# Patient Record
Sex: FEMALE | Race: OTHER | HISPANIC OR LATINO | URBAN - METROPOLITAN AREA
[De-identification: names, ages, dates, MRNs, and addresses within clinical notes are randomized per-mention and may not be internally consistent; named-entity substitution may affect disease eponyms.]

---

## 2022-09-18 ENCOUNTER — EMERGENCY (EMERGENCY)
Facility: HOSPITAL | Age: 67
LOS: 1 days | Discharge: HOME | End: 2022-09-18
Attending: STUDENT IN AN ORGANIZED HEALTH CARE EDUCATION/TRAINING PROGRAM | Admitting: STUDENT IN AN ORGANIZED HEALTH CARE EDUCATION/TRAINING PROGRAM

## 2022-09-18 VITALS
OXYGEN SATURATION: 99 % | TEMPERATURE: 98 F | HEART RATE: 77 BPM | DIASTOLIC BLOOD PRESSURE: 66 MMHG | SYSTOLIC BLOOD PRESSURE: 123 MMHG | RESPIRATION RATE: 18 BRPM

## 2022-09-18 LAB
A1C WITH ESTIMATED AVERAGE GLUCOSE RESULT: 9.2 % — HIGH (ref 4–5.6)
ALBUMIN SERPL ELPH-MCNC: 4.4 G/DL — SIGNIFICANT CHANGE UP (ref 3.3–5)
ALP SERPL-CCNC: 197 U/L — HIGH (ref 40–120)
ALT FLD-CCNC: 32 U/L — SIGNIFICANT CHANGE UP (ref 4–33)
ANION GAP SERPL CALC-SCNC: 13 MMOL/L — SIGNIFICANT CHANGE UP (ref 7–14)
AST SERPL-CCNC: 22 U/L — SIGNIFICANT CHANGE UP (ref 4–32)
B PERT DNA SPEC QL NAA+PROBE: SIGNIFICANT CHANGE UP
B PERT+PARAPERT DNA PNL SPEC NAA+PROBE: SIGNIFICANT CHANGE UP
B-OH-BUTYR SERPL-SCNC: 0.3 MMOL/L — SIGNIFICANT CHANGE UP (ref 0–0.4)
BASOPHILS # BLD AUTO: 0.05 K/UL — SIGNIFICANT CHANGE UP (ref 0–0.2)
BASOPHILS NFR BLD AUTO: 1 % — SIGNIFICANT CHANGE UP (ref 0–2)
BILIRUB SERPL-MCNC: 0.2 MG/DL — SIGNIFICANT CHANGE UP (ref 0.2–1.2)
BLOOD GAS VENOUS COMPREHENSIVE RESULT: SIGNIFICANT CHANGE UP
BORDETELLA PARAPERTUSSIS (RAPRVP): SIGNIFICANT CHANGE UP
BUN SERPL-MCNC: 26 MG/DL — HIGH (ref 7–23)
C PNEUM DNA SPEC QL NAA+PROBE: SIGNIFICANT CHANGE UP
CALCIUM SERPL-MCNC: 9.7 MG/DL — SIGNIFICANT CHANGE UP (ref 8.4–10.5)
CHLORIDE SERPL-SCNC: 103 MMOL/L — SIGNIFICANT CHANGE UP (ref 98–107)
CO2 SERPL-SCNC: 20 MMOL/L — LOW (ref 22–31)
CREAT SERPL-MCNC: 0.68 MG/DL — SIGNIFICANT CHANGE UP (ref 0.5–1.3)
EGFR: 97 ML/MIN/1.73M2 — SIGNIFICANT CHANGE UP
EOSINOPHIL # BLD AUTO: 0.2 K/UL — SIGNIFICANT CHANGE UP (ref 0–0.5)
EOSINOPHIL NFR BLD AUTO: 4 % — SIGNIFICANT CHANGE UP (ref 0–6)
ESTIMATED AVERAGE GLUCOSE: 217 — SIGNIFICANT CHANGE UP
FLUAV SUBTYP SPEC NAA+PROBE: SIGNIFICANT CHANGE UP
FLUBV RNA SPEC QL NAA+PROBE: SIGNIFICANT CHANGE UP
GLUCOSE SERPL-MCNC: 269 MG/DL — HIGH (ref 70–99)
HADV DNA SPEC QL NAA+PROBE: SIGNIFICANT CHANGE UP
HCOV 229E RNA SPEC QL NAA+PROBE: SIGNIFICANT CHANGE UP
HCOV HKU1 RNA SPEC QL NAA+PROBE: SIGNIFICANT CHANGE UP
HCOV NL63 RNA SPEC QL NAA+PROBE: SIGNIFICANT CHANGE UP
HCOV OC43 RNA SPEC QL NAA+PROBE: SIGNIFICANT CHANGE UP
HCT VFR BLD CALC: 47.5 % — HIGH (ref 34.5–45)
HGB BLD-MCNC: 15.6 G/DL — HIGH (ref 11.5–15.5)
HMPV RNA SPEC QL NAA+PROBE: SIGNIFICANT CHANGE UP
HPIV1 RNA SPEC QL NAA+PROBE: SIGNIFICANT CHANGE UP
HPIV2 RNA SPEC QL NAA+PROBE: SIGNIFICANT CHANGE UP
HPIV3 RNA SPEC QL NAA+PROBE: SIGNIFICANT CHANGE UP
HPIV4 RNA SPEC QL NAA+PROBE: SIGNIFICANT CHANGE UP
IANC: 2.91 K/UL — SIGNIFICANT CHANGE UP (ref 1.8–7.4)
IMM GRANULOCYTES NFR BLD AUTO: 0 % — SIGNIFICANT CHANGE UP (ref 0–0.9)
LYMPHOCYTES # BLD AUTO: 1.43 K/UL — SIGNIFICANT CHANGE UP (ref 1–3.3)
LYMPHOCYTES # BLD AUTO: 28.6 % — SIGNIFICANT CHANGE UP (ref 13–44)
M PNEUMO DNA SPEC QL NAA+PROBE: SIGNIFICANT CHANGE UP
MCHC RBC-ENTMCNC: 32 PG — SIGNIFICANT CHANGE UP (ref 27–34)
MCHC RBC-ENTMCNC: 32.8 GM/DL — SIGNIFICANT CHANGE UP (ref 32–36)
MCV RBC AUTO: 97.5 FL — SIGNIFICANT CHANGE UP (ref 80–100)
MONOCYTES # BLD AUTO: 0.41 K/UL — SIGNIFICANT CHANGE UP (ref 0–0.9)
MONOCYTES NFR BLD AUTO: 8.2 % — SIGNIFICANT CHANGE UP (ref 2–14)
NEUTROPHILS # BLD AUTO: 2.91 K/UL — SIGNIFICANT CHANGE UP (ref 1.8–7.4)
NEUTROPHILS NFR BLD AUTO: 58.2 % — SIGNIFICANT CHANGE UP (ref 43–77)
NRBC # BLD: 0 /100 WBCS — SIGNIFICANT CHANGE UP (ref 0–0)
NRBC # FLD: 0 K/UL — SIGNIFICANT CHANGE UP (ref 0–0)
PLATELET # BLD AUTO: 263 K/UL — SIGNIFICANT CHANGE UP (ref 150–400)
POTASSIUM SERPL-MCNC: 4.2 MMOL/L — SIGNIFICANT CHANGE UP (ref 3.5–5.3)
POTASSIUM SERPL-SCNC: 4.2 MMOL/L — SIGNIFICANT CHANGE UP (ref 3.5–5.3)
PROT SERPL-MCNC: 7.4 G/DL — SIGNIFICANT CHANGE UP (ref 6–8.3)
RAPID RVP RESULT: SIGNIFICANT CHANGE UP
RBC # BLD: 4.87 M/UL — SIGNIFICANT CHANGE UP (ref 3.8–5.2)
RBC # FLD: 13.4 % — SIGNIFICANT CHANGE UP (ref 10.3–14.5)
RSV RNA SPEC QL NAA+PROBE: SIGNIFICANT CHANGE UP
RV+EV RNA SPEC QL NAA+PROBE: SIGNIFICANT CHANGE UP
SARS-COV-2 RNA SPEC QL NAA+PROBE: SIGNIFICANT CHANGE UP
SODIUM SERPL-SCNC: 136 MMOL/L — SIGNIFICANT CHANGE UP (ref 135–145)
TSH SERPL-MCNC: 2.44 UIU/ML — SIGNIFICANT CHANGE UP (ref 0.27–4.2)
WBC # BLD: 5 K/UL — SIGNIFICANT CHANGE UP (ref 3.8–10.5)
WBC # FLD AUTO: 5 K/UL — SIGNIFICANT CHANGE UP (ref 3.8–10.5)

## 2022-09-18 PROCEDURE — 99285 EMERGENCY DEPT VISIT HI MDM: CPT | Mod: 25

## 2022-09-18 PROCEDURE — 93010 ELECTROCARDIOGRAM REPORT: CPT

## 2022-09-18 RX ORDER — SODIUM CHLORIDE 9 MG/ML
1000 INJECTION INTRAMUSCULAR; INTRAVENOUS; SUBCUTANEOUS ONCE
Refills: 0 | Status: COMPLETED | OUTPATIENT
Start: 2022-09-18 | End: 2022-09-18

## 2022-09-18 RX ORDER — FAMOTIDINE 10 MG/ML
20 INJECTION INTRAVENOUS ONCE
Refills: 0 | Status: COMPLETED | OUTPATIENT
Start: 2022-09-18 | End: 2022-09-18

## 2022-09-18 RX ADMIN — FAMOTIDINE 20 MILLIGRAM(S): 10 INJECTION INTRAVENOUS at 18:54

## 2022-09-18 RX ADMIN — SODIUM CHLORIDE 1000 MILLILITER(S): 9 INJECTION INTRAMUSCULAR; INTRAVENOUS; SUBCUTANEOUS at 18:54

## 2022-09-18 RX ADMIN — Medication 30 MILLILITER(S): at 19:44

## 2022-09-18 NOTE — ED PROVIDER NOTE - PATIENT PORTAL LINK FT
You can access the FollowMyHealth Patient Portal offered by [facility] by registering at the following website: [email or web address]. By joining [employer]’s FollowMyHealth portal, you will also be able to view your health information using other applications (apps) compatible with our system.

## 2022-09-18 NOTE — ED ADULT NURSE NOTE - OBJECTIVE STATEMENT
Pt arriving to intake 9 A&OX4 ambulatory c/o abdominal discomfort. Pt sent by Urgent Care for high blood sugar. Endorsing rash to L arm. Respirations even and unlabored. No acute distress. Son at bedside for translation. 20G IV placed in RAC. Labs drawn and sent. Medicated as per LAVERNE. MD at bedside for eval.

## 2022-09-18 NOTE — ED PROVIDER NOTE - NSPTACCESSSVCSAPPTDETAILS_ED_ALL_ED_FT
diabetes mellitus type 2 with hgb a1c of 9.2 diabetes mellitus type 2 with hgb a1c of 9.2    Please call patient's son Juice who provides translation ([phone])

## 2022-09-18 NOTE — ED PROVIDER NOTE - ATTENDING APP SHARED VISIT CONTRIBUTION OF CARE
67 y/o F with PMH HTN, HLD, DM on oral treatment w/ metformin , jardiacnce p/w increase fatigue and malaise along with abdominal bloating. pt reports her blood sugar is usually in the 100's was elevated in the 300's today. She states she feels fatigued and has developed a rash on her b/l LE w/ movement to her abdomen. Pt states her granddaugher is sick with a uri and rash as well.   denies fever, chills, chest pain, SOB, abdominal pain, diarrhea, dysuria, syncope, bleeding, +new rash,weakness, numbness, blurred vision    ROS  otherwise negative as per HPI  Gen: Awake, Alert, WD, WN, NAD  Head:  NC/AT  Eyes:  PERRL, EOMI, Conjunctiva pink, lids normal, no scleral icterus  ENT: OP clear, moist mucus membranes  Neck: supple, nontender, no meningismus, no JVD, trachea midline  Cardiac/CV:  S1 S2, RRR, no M/G/R  Respiratory/Pulm:  CTAB, good air movement, normal resp effort, no wheezes/stridor/retractions/rales/rhonchi  Gastrointestinal/Abdomen:  Soft, nontender, nondistended, +BS, no rebound/guarding  Back:  no CVAT, no MLT  Ext:  warm, well perfused, moving all extremities spontaneously, no peripheral edema, distal pulses intact  Skin: maccular rash on chest and extremities   Neuro:  AAOx3, sensation intact, motor 5/5 x 4 extremities, normal gait, speech clear  mdm as above

## 2022-09-18 NOTE — ED PROVIDER NOTE - CROS ED SKIN NEG
Sahil called in and stated he needs the death certificate signed ,    HE STATED THEY CANNOT MOVE FORWARD WITH  PLANS UNTIL IT IS SIGNED . SAHIL HAS REQUESTED TO SPEAK TO THE PRACTICE MANAGER . HUB ATTEMPTED CONTACT PRACTICE NOT SUCCESSFUL .         SAHIL CALL BACK  [phone]    no itch

## 2022-09-18 NOTE — ED ADULT NURSE NOTE - CHIEF COMPLAINT QUOTE
Pt AOX4 c/o indigestion and elevated glucose, Hx of DM type 2 fs glu 253 in triage; was seen in Urgi care and glu was 303; pt has felt a little bloated since returning from [facility] 3 days ago

## 2022-09-18 NOTE — ED PROVIDER NOTE - NS ED ATTENDING STATEMENT MOD
This was a shared visit with the JIAN. I reviewed and verified the documentation and independently performed the documented: Crescentic Advancement Flap Text: The defect edges were debeveled with a #15 scalpel blade.  Given the location of the defect and the proximity to free margins a crescentic advancement flap was deemed most appropriate.  Using a sterile surgical marker, the appropriate advancement flap was drawn incorporating the defect and placing the expected incisions within the relaxed skin tension lines where possible.    The area thus outlined was incised deep to adipose tissue with a #15 scalpel blade.  The skin margins were undermined to an appropriate distance in all directions utilizing iris scissors.

## 2022-09-18 NOTE — ED PROVIDER NOTE - NSCAREINITIATED _GEN_ER
.      CERTIFICATE OF RETURN TO SCHOOL    April 12, 2022      Re:   Cristhian Edward  [address] [phone]                        This is to certify that [employer] [facility] Edward has been under my care from 4/12/2022 and can return on 4/18/22.           SIGNATURE:___________________________________________,   4/12/2022      Marilu Mallory PA-C           [facility] [place]  [facility]  [address] [id number] [phone]  Ext. [phone]   Immanuel Alonso)

## 2022-09-18 NOTE — ED PROVIDER NOTE - NSFOLLOWUPINSTRUCTIONS_ED_ALL_ED_FT
Advance activity as tolerated.  Continue all previously prescribed medications as directed unless otherwise instructed.  Check your fingerstick glucose before every meal and every day at bedtime.  Follow up with your primary care physician and endocrinology (call [phone] to make an appointment) in 48-72 hours- bring copies of your results.  Return to the ER for worsening or persistent symptoms, including but not limited to worsening/persistent lightheadedness, passing out, blood sugars less than 70 (you were given verbal and printed instructions on how to manage low blood sugar), blood sugars over 300, rash involving your mouth/palms/soles, chest pain, shortness of breath, and/or ANY NEW OR CONCERNING SYMPTOMS. If you have issues obtaining follow up, please call: [phone] to obtain a doctor or specialist who takes your insurance in your area.  You may call [phone] to make an appointment with the internal medicine clinic.

## 2022-09-18 NOTE — ED PROVIDER NOTE - OBJECTIVE STATEMENT
Pt is a 67 y/o F PMHx DM type 2, HTN, HLD, cholecystectomy p/w tiredness, abdominal bloating and elevated blood glucose today.  Pt speaks Sudanese; electing to have son, graciela Bose.  Pt reports recently returning from trip to [facility].  For past 2 days, pt developed painless nonpruritic scattered rash, which started at bilateral thighs, then began to spread to abdomen and bilateral arms.  Pt reports developing tiredness, malaise and abdominal bloating this morning.  Abdominal bloating has since resolved.  Pt also found to have blood glucose ~260.  She was evaluated at urgent care where she was directed to ED for evaluation. + sick contact with granddaughter who has a fever and URI symptoms. Pt is a 65 y/o F PMHx DM type 2, HTN, HLD, cholecystectomy p/w tiredness, abdominal bloating and elevated blood glucose today.  Pt speaks [place]; electing to have son, graciela Bose.  Pt reports recently returning from trip to [facility].  For past 2 days, pt developed painless nonpruritic scattered rash, which started at bilateral thighs, then began to spread to abdomen and bilateral arms.  Pt reports developing tiredness, malaise and abdominal bloating this morning.  Abdominal bloating has since resolved.  Pt also found to have blood glucose ~260.  She was evaluated at urgent care where she was directed to ED for evaluation. + sick contact with granddaughter who has a fever and URI symptoms.  Denies any fevers, chills, nausea, vomiting, chest pain, SOB, jaw/arm/neck/back pain, dysuria, cloudy urine, diarrhea, constipation, numbness, weakness, headache, neck pain/stiffness, nasal congestion, throat pain, illicit drug use, ETOH abuse, new medications/foods/cosmetic products.

## 2022-09-18 NOTE — ED ADULT TRIAGE NOTE - CHIEF COMPLAINT QUOTE
Pt AOX4 c/o indigestion and elevated glucose, Hx of DM type 2 fs glu 253 in triage; was seen in Urgi care and glu was 303; pt has felt a little bloated since returning from [facility] 3 days ago

## 2022-09-18 NOTE — ED PROVIDER NOTE - PROGRESS NOTE DETAILS
AMANDA SANCHEZ:  Pt offers no active complaints at this time. Hgb A1c noted to be 9.2.  Pt electing to follow up with her endocrinologist.  Pt medically stable for discharge.  Strict return precautions given.  pt to follow up with PMD.  Reassessment performed and plan for discharge discussed with Dr. Hamilton who agrees with disposition and discharge plan.

## 2022-09-18 NOTE — ED PROVIDER NOTE - CLINICAL SUMMARY MEDICAL DECISION MAKING FREE TEXT BOX
Pt is a 65 y/o F PMHx DM type 2, HTN, HLD, cholecystectomy p/w tiredness, abdominal bloating and elevated blood glucose today. -- likely viral syndrome -- labs, tsh, bhb, vbg, iv fluids, pepcid, maalox

## 2022-11-16 ENCOUNTER — EMERGENCY (EMERGENCY)
Facility: HOSPITAL | Age: 67
LOS: 1 days | Discharge: HOME | End: 2022-11-16
Attending: EMERGENCY MEDICINE | Admitting: EMERGENCY MEDICINE

## 2022-11-16 VITALS
SYSTOLIC BLOOD PRESSURE: 106 MMHG | OXYGEN SATURATION: 100 % | RESPIRATION RATE: 20 BRPM | HEART RATE: 72 BPM | TEMPERATURE: 98 F | DIASTOLIC BLOOD PRESSURE: 48 MMHG

## 2022-11-16 VITALS
OXYGEN SATURATION: 100 % | HEART RATE: 63 BPM | TEMPERATURE: 98 F | SYSTOLIC BLOOD PRESSURE: 111 MMHG | DIASTOLIC BLOOD PRESSURE: 60 MMHG | RESPIRATION RATE: 17 BRPM

## 2022-11-16 LAB
ALBUMIN SERPL ELPH-MCNC: 4.3 G/DL — SIGNIFICANT CHANGE UP (ref 3.3–5)
ALP SERPL-CCNC: 101 U/L — SIGNIFICANT CHANGE UP (ref 40–120)
ALT FLD-CCNC: 20 U/L — SIGNIFICANT CHANGE UP (ref 4–33)
ANION GAP SERPL CALC-SCNC: 12 MMOL/L — SIGNIFICANT CHANGE UP (ref 7–14)
AST SERPL-CCNC: 22 U/L — SIGNIFICANT CHANGE UP (ref 4–32)
BASOPHILS # BLD AUTO: 0.03 K/UL — SIGNIFICANT CHANGE UP (ref 0–0.2)
BASOPHILS NFR BLD AUTO: 0.6 % — SIGNIFICANT CHANGE UP (ref 0–2)
BILIRUB SERPL-MCNC: 0.4 MG/DL — SIGNIFICANT CHANGE UP (ref 0.2–1.2)
BUN SERPL-MCNC: 19 MG/DL — SIGNIFICANT CHANGE UP (ref 7–23)
CALCIUM SERPL-MCNC: 9.5 MG/DL — SIGNIFICANT CHANGE UP (ref 8.4–10.5)
CHLORIDE SERPL-SCNC: 106 MMOL/L — SIGNIFICANT CHANGE UP (ref 98–107)
CO2 SERPL-SCNC: 22 MMOL/L — SIGNIFICANT CHANGE UP (ref 22–31)
CREAT SERPL-MCNC: 0.66 MG/DL — SIGNIFICANT CHANGE UP (ref 0.5–1.3)
EGFR: 97 ML/MIN/1.73M2 — SIGNIFICANT CHANGE UP
EOSINOPHIL # BLD AUTO: 0.09 K/UL — SIGNIFICANT CHANGE UP (ref 0–0.5)
EOSINOPHIL NFR BLD AUTO: 1.9 % — SIGNIFICANT CHANGE UP (ref 0–6)
GLUCOSE SERPL-MCNC: 120 MG/DL — HIGH (ref 70–99)
HCT VFR BLD CALC: 40.8 % — SIGNIFICANT CHANGE UP (ref 34.5–45)
HGB BLD-MCNC: 13.4 G/DL — SIGNIFICANT CHANGE UP (ref 11.5–15.5)
IANC: 2.84 K/UL — SIGNIFICANT CHANGE UP (ref 1.8–7.4)
IMM GRANULOCYTES NFR BLD AUTO: 0.2 % — SIGNIFICANT CHANGE UP (ref 0–0.9)
LYMPHOCYTES # BLD AUTO: 1.37 K/UL — SIGNIFICANT CHANGE UP (ref 1–3.3)
LYMPHOCYTES # BLD AUTO: 29.1 % — SIGNIFICANT CHANGE UP (ref 13–44)
MCHC RBC-ENTMCNC: 31.5 PG — SIGNIFICANT CHANGE UP (ref 27–34)
MCHC RBC-ENTMCNC: 32.8 GM/DL — SIGNIFICANT CHANGE UP (ref 32–36)
MCV RBC AUTO: 96 FL — SIGNIFICANT CHANGE UP (ref 80–100)
MONOCYTES # BLD AUTO: 0.37 K/UL — SIGNIFICANT CHANGE UP (ref 0–0.9)
MONOCYTES NFR BLD AUTO: 7.9 % — SIGNIFICANT CHANGE UP (ref 2–14)
NEUTROPHILS # BLD AUTO: 2.84 K/UL — SIGNIFICANT CHANGE UP (ref 1.8–7.4)
NEUTROPHILS NFR BLD AUTO: 60.3 % — SIGNIFICANT CHANGE UP (ref 43–77)
NRBC # BLD: 0 /100 WBCS — SIGNIFICANT CHANGE UP (ref 0–0)
NRBC # FLD: 0 K/UL — SIGNIFICANT CHANGE UP (ref 0–0)
PLATELET # BLD AUTO: 225 K/UL — SIGNIFICANT CHANGE UP (ref 150–400)
POTASSIUM SERPL-MCNC: 4.1 MMOL/L — SIGNIFICANT CHANGE UP (ref 3.5–5.3)
POTASSIUM SERPL-SCNC: 4.1 MMOL/L — SIGNIFICANT CHANGE UP (ref 3.5–5.3)
PROT SERPL-MCNC: 7.2 G/DL — SIGNIFICANT CHANGE UP (ref 6–8.3)
RBC # BLD: 4.25 M/UL — SIGNIFICANT CHANGE UP (ref 3.8–5.2)
RBC # FLD: 13 % — SIGNIFICANT CHANGE UP (ref 10.3–14.5)
SODIUM SERPL-SCNC: 140 MMOL/L — SIGNIFICANT CHANGE UP (ref 135–145)
WBC # BLD: 4.71 K/UL — SIGNIFICANT CHANGE UP (ref 3.8–10.5)
WBC # FLD AUTO: 4.71 K/UL — SIGNIFICANT CHANGE UP (ref 3.8–10.5)

## 2022-11-16 PROCEDURE — 99284 EMERGENCY DEPT VISIT MOD MDM: CPT

## 2022-11-16 PROCEDURE — 73564 X-RAY EXAM KNEE 4 OR MORE: CPT | Mod: 26,LT

## 2022-11-16 PROCEDURE — 73630 X-RAY EXAM OF FOOT: CPT | Mod: 26,50

## 2022-11-16 PROCEDURE — 73502 X-RAY EXAM HIP UNI 2-3 VIEWS: CPT | Mod: 26,LT

## 2022-11-16 RX ORDER — KETOROLAC TROMETHAMINE 30 MG/ML
15 SYRINGE (ML) INJECTION ONCE
Refills: 0 | Status: DISCONTINUED | OUTPATIENT
Start: 2022-11-16 | End: 2022-11-16

## 2022-11-16 RX ADMIN — Medication 15 MILLIGRAM(S): at 14:56

## 2022-11-16 NOTE — ED ADULT NURSE NOTE - OBJECTIVE STATEMENT
Pt received to surg, awake and alert, A&OX4, ambulatory. Primarily Bulgarian speaking, family assisting with translation. C/o BL leg pain x1 week, worsening over the last few days. States pain is worse with movement. Denies radiation of pain.  Respirations even and unlabored. Resting comfortably. Denies injury or trauma, CP, SOB, N/V, HA, dizziness, palpitations, fatigue. 20G IV placed to  LAC. NAD.

## 2022-11-16 NOTE — ED PROVIDER NOTE - CLINICAL SUMMARY MEDICAL DECISION MAKING FREE TEXT BOX
67-year-old female history of arthritis here with acute on chronic joint pain.  Obtain x-rays give pain medication likely discharge with Ortho referral.

## 2022-11-16 NOTE — ED PROVIDER NOTE - PROGRESS NOTE DETAILS
Lai, Med Tox Fellow: pts hip xrays done just not uploaded, spoke to xray and they're trying to upload images, no fx seen on images, pt can f/u outpt

## 2022-11-16 NOTE — ED PROVIDER NOTE - OBJECTIVE STATEMENT
67-year-old female history of diabetes hypertension osteoarthritis here with multiple medical complaints.  Declines , prefers daughter translate.  She complains of pain and swelling in the right foot the left foot the left knee and the right hip.  Denies any recent trauma.  Per patient's daughter, she has had the symptoms for the last few months but they have been worsening over the last few days.  She denies any history of DVT or PE, no leg redness, no chest pain or trouble breathing.  Her pcp rx gabapentin but no relief.

## 2022-11-16 NOTE — ED PROVIDER NOTE - NSFOLLOWUPCLINICS_GEN_ALL_ED_FT
[facility]  Orthopedic Surgery  [address] [address]  Phone: [phone]  Fax:   Follow Up Time: 4-6 Days

## 2022-11-16 NOTE — ED PROVIDER NOTE - NSFOLLOWUPINSTRUCTIONS_ED_ALL_ED_FT
- Please follow up with your Primary Care Doctor within 1 week. Bring your results from today.    - Tylenol up to 650 mg every 6 hours as needed for pain and/or Ibuprofen up to 400 mg every 6 hours as needed for pain.    - Be sure to return to the ED if you develop new, worsening, or any distressing symptoms.

## 2022-11-16 NOTE — ED PROVIDER NOTE - PATIENT PORTAL LINK FT
You can access the FollowMyHealth Patient Portal offered by [facility] by registering at the following website: [email or web address]. By joining [employer]’s FollowMyHealth portal, you will also be able to view your health information using other applications (apps) compatible with our system.

## 2022-11-16 NOTE — ED PROVIDER NOTE - MUSCULOSKELETAL MINIMAL EXAM
no swelling, no redness, full rom all joints, mild ttp over b/l feet, l knee, l hip.  fully wt bearing and ambulatory

## 2022-11-16 NOTE — ED PROVIDER NOTE - CARE PLAN
Principal Discharge DX:	Pain in joints of both feet  Secondary Diagnosis:	Knee pain  Secondary Diagnosis:	Hip pain   1

## 2023-08-07 ENCOUNTER — APPOINTMENT (OUTPATIENT)
Dept: GASTROENTEROLOGY | Facility: CLINIC | Age: 68
End: 2023-08-07
Payer: MEDICARE

## 2023-08-07 VITALS
DIASTOLIC BLOOD PRESSURE: 60 MMHG | WEIGHT: 147.5 LBS | OXYGEN SATURATION: 98 % | HEIGHT: 58.66 IN | SYSTOLIC BLOOD PRESSURE: 100 MMHG | HEART RATE: 80 BPM | RESPIRATION RATE: 14 BRPM | TEMPERATURE: 98 F | BODY MASS INDEX: 30.13 KG/M2

## 2023-08-07 DIAGNOSIS — E78.2 MIXED HYPERLIPIDEMIA: ICD-10-CM

## 2023-08-07 DIAGNOSIS — K62.89 OTHER SPECIFIED DISEASES OF ANUS AND RECTUM: ICD-10-CM

## 2023-08-07 DIAGNOSIS — E11.9 TYPE 2 DIABETES MELLITUS W/OUT COMPLICATIONS: ICD-10-CM

## 2023-08-07 PROBLEM — Z00.00 ENCOUNTER FOR PREVENTIVE HEALTH EXAMINATION: Status: ACTIVE | Noted: 2023-08-07

## 2023-08-07 PROCEDURE — 99204 OFFICE O/P NEW MOD 45 MIN: CPT | Mod: 25

## 2023-08-07 PROCEDURE — 83014 H PYLORI DRUG ADMIN: CPT

## 2023-08-07 RX ORDER — FAMOTIDINE 40 MG/1
40 TABLET, FILM COATED ORAL
Refills: 0 | Status: ACTIVE | COMMUNITY

## 2023-08-07 RX ORDER — ALENDRONATE SODIUM 70 MG/1
70 TABLET ORAL
Refills: 0 | Status: ACTIVE | COMMUNITY

## 2023-08-07 RX ORDER — ORAL SEMAGLUTIDE 14 MG/1
14 TABLET ORAL
Refills: 0 | Status: ACTIVE | COMMUNITY

## 2023-08-07 RX ORDER — ASPIRIN 81 MG
81 TABLET, DELAYED RELEASE (ENTERIC COATED) ORAL
Refills: 0 | Status: ACTIVE | COMMUNITY

## 2023-08-07 RX ORDER — PIOGLITAZONE AND GLIMEPIRIDE 30; 4 MG/1; MG/1
TABLET ORAL
Refills: 0 | Status: ACTIVE | COMMUNITY

## 2023-08-07 RX ORDER — HYDROCORTISONE 25 MG/G
2.5 CREAM TOPICAL
Qty: 1 | Refills: 2 | Status: ACTIVE | COMMUNITY
Start: 2023-08-07 | End: 1900-01-01

## 2023-08-07 RX ORDER — ROSUVASTATIN CALCIUM 5 MG/1
TABLET, FILM COATED ORAL
Refills: 0 | Status: ACTIVE | COMMUNITY

## 2023-08-07 RX ORDER — LISINOPRIL 30 MG/1
TABLET ORAL
Refills: 0 | Status: ACTIVE | COMMUNITY

## 2023-08-07 RX ORDER — GLIMEPIRIDE 4 MG/1
TABLET ORAL
Refills: 0 | Status: ACTIVE | COMMUNITY

## 2023-08-07 NOTE — HISTORY OF PRESENT ILLNESS
[FreeTextEntry1] : 66 yo female, from [facility], referred for evaluation of bloating, loose bms, epigastric pain. Previous evaluation from ? Valentin Lambert MD records to be requested. Hx of DM, hld, HTN and DM. No weight loss or anorexia.complains of rectal discomfort/pain, not responded to bacitracin rx by GYN md.

## 2023-08-07 NOTE — REVIEW OF SYSTEMS
[As Noted in HPI] : as noted in HPI [Abdominal Pain] : abdominal pain [Constipation] : constipation [Diarrhea] : diarrhea [Negative] : Heme/Lymph [Recent Weight Gain (___ Lbs)] : no recent weight gain [Recent Weight Loss (___ Lbs)] : no recent weight loss

## 2023-08-07 NOTE — ASSESSMENT
[FreeTextEntry1] : 66 yo female, from [facility], referred for evaluation of bloating, loose bms, epigastric pain. Previous evaluation from ? Valentin Lambert MD records to be requested. Hx of DM, hld, HTN and DM. No weight loss or anorexia.  IMP: 1. bloating, epigastric pain - exclude Hpylori 2. Altered bms 3. Type 2 DM 4. HLD 5. HTN  PLAN: 1. UBT 2. await and request previous GI records 3. RTO 6 weeks

## 2023-08-07 NOTE — PHYSICAL EXAM

## 2023-08-09 LAB — UREA BREATH TEST QL: NEGATIVE

## 2023-12-15 ENCOUNTER — APPOINTMENT (OUTPATIENT)
Dept: GASTROENTEROLOGY | Facility: CLINIC | Age: 68
End: 2023-12-15

## 2024-01-26 ENCOUNTER — APPOINTMENT (OUTPATIENT)
Dept: GASTROENTEROLOGY | Facility: CLINIC | Age: 69
End: 2024-01-26
Payer: MEDICARE

## 2024-01-26 VITALS
WEIGHT: 139 LBS | HEIGHT: 58.66 IN | HEART RATE: 79 BPM | DIASTOLIC BLOOD PRESSURE: 74 MMHG | OXYGEN SATURATION: 98 % | BODY MASS INDEX: 28.4 KG/M2 | TEMPERATURE: 97.7 F | RESPIRATION RATE: 14 BRPM | SYSTOLIC BLOOD PRESSURE: 120 MMHG

## 2024-01-26 DIAGNOSIS — R19.8 OTHER SPECIFIED SYMPTOMS AND SIGNS INVOLVING THE DIGESTIVE SYSTEM AND ABDOMEN: ICD-10-CM

## 2024-01-26 DIAGNOSIS — K22.70 BARRETT'S ESOPHAGUS W/OUT DYSPLASIA: ICD-10-CM

## 2024-01-26 PROCEDURE — 99214 OFFICE O/P EST MOD 30 MIN: CPT

## 2024-01-26 NOTE — HISTORY OF PRESENT ILLNESS
[de-identified] : 04/2022 Small hh, Perea's esophagus (Augello) [FreeTextEntry1] : 04/2022 dim polyp, grade 2 int. hem ( Petra)

## 2024-01-26 NOTE — ASSESSMENT
[FreeTextEntry1] : 69 yo female, from [facility], referred for evaluation of bloating, loose bms, epigastric pain. Previous evaluation from Yohana Lambert MD records to be requested. Hx of DM, hld, HTN and DM. No weight loss or anorexia.complains of rectal discomfort/pain, not responded to bacitracin rx by GYN md.  RTO today 1/25/24- reveiwed egd and supplement from Dr. Lambert. Hx of BE for followup; has altered bms, sometimes constipated. Seen with Upper sorbian speaking . IMP: 1. bloating, epigastric pain /BE 2. Altered bms 3. Type 2 DM 4. HLD 5. HTN  PLAN: 1. She  was advised to undergo endoscopy to which she agreed. The procedure will be performed in [facility] with the assistance of an anesthesiologist. The procedure was explained in detail and she understood the risks of the procedure not limited  to infection, bleeding, perforation, risk of anesthesia and risk of IV site problems,emergency surgery, missed lesions  or non-diagnosis of stomach or esophageal  cancer.He/She]  was advised that she could not drive home alone, if the patient chooses to receive sedation. Further diagnostic and treatment recommendations will be based upon the procedure and any biopsies, if they are taken. 2. Metamucil 1 tbsp poqd 3. Maintain famotidine 40 mg qd as d/w pt

## 2024-01-26 NOTE — PHYSICAL EXAM
[Alert] : alert [Normal Voice/Communication] : normal voice/communication [No Acute Distress] : no acute distress [Healthy Appearing] : healthy appearing [Sclera] : the sclera and conjunctiva were normal [Hearing Threshold Finger Rub Not [place]] : hearing was normal [Normal Lips/Gums] : the lips and gums were normal [Oropharynx] : the oropharynx was normal [Normal Appearance] : the appearance of the neck was normal [No Neck Mass] : no neck mass was observed [No Respiratory Distress] : no respiratory distress [Respiration, Rhythm And Depth] : normal respiratory rhythm and effort [No Acc Muscle Use] : no accessory muscle use [Auscultation Breath Sounds / Voice Sounds] : lungs were clear to auscultation bilaterally [Heart Rate And Rhythm] : heart rate was normal and rhythm regular [Normal S1, S2] : normal S1 and S2 [Murmurs] : no murmurs [Bowel Sounds] : normal bowel sounds [Abdomen Tenderness] : non-tender [No Masses] : no abdominal mass palpated [Abdomen Soft] : soft [] : no hepatosplenomegaly [Oriented To Time, Place, And Person] : oriented to person, place, and time

## 2024-02-08 ENCOUNTER — RESULT REVIEW (OUTPATIENT)
Age: 69
End: 2024-02-08

## 2024-02-08 ENCOUNTER — APPOINTMENT (OUTPATIENT)
Dept: GASTROENTEROLOGY | Facility: AMBULATORY SURGERY CENTER | Age: 69
End: 2024-02-08
Payer: MEDICARE

## 2024-02-08 PROCEDURE — 43239 EGD BIOPSY SINGLE/MULTIPLE: CPT

## 2024-03-26 ENCOUNTER — APPOINTMENT (OUTPATIENT)
Dept: GASTROENTEROLOGY | Facility: CLINIC | Age: 69
End: 2024-03-26
Payer: MEDICARE

## 2024-03-26 VITALS
OXYGEN SATURATION: 99 % | HEIGHT: 58.66 IN | DIASTOLIC BLOOD PRESSURE: 60 MMHG | TEMPERATURE: 97.7 F | HEART RATE: 78 BPM | BODY MASS INDEX: 28.2 KG/M2 | WEIGHT: 138 LBS | SYSTOLIC BLOOD PRESSURE: 110 MMHG | RESPIRATION RATE: 14 BRPM

## 2024-03-26 DIAGNOSIS — R10.13 EPIGASTRIC PAIN: ICD-10-CM

## 2024-03-26 DIAGNOSIS — I10 ESSENTIAL (PRIMARY) HYPERTENSION: ICD-10-CM

## 2024-03-26 PROCEDURE — 99214 OFFICE O/P EST MOD 30 MIN: CPT

## 2024-03-26 RX ORDER — OMEPRAZOLE 40 MG/1
40 CAPSULE, DELAYED RELEASE ORAL
Qty: 90 | Refills: 1 | Status: DISCONTINUED | COMMUNITY
Start: 2024-02-15 | End: 2024-03-26

## 2024-03-26 RX ORDER — PSYLLIUM HUSK (WITH SUGAR) 3 G/7 G
43 POWDER (GRAM) ORAL
Qty: 1 | Refills: 0 | Status: ACTIVE | COMMUNITY
Start: 2024-03-26 | End: 1900-01-01

## 2024-03-26 NOTE — ASSESSMENT
[FreeTextEntry1] : 69 yo female, from [facility], referred for evaluation of bloating, loose bms, epigastric pain. Previous evaluation from ? Valentin Lambert MD records to be requested. Hx of DM, hld, HTN and DM. No weight loss or anorexia.complains of rectal discomfort/pain, not responded to bacitracin rx by GYN md.  RTO today 1/25/24- reveiwed egd and supplement from Dr. Lambert. Hx of BE for followup; has altered bms, sometimes constipated. Seen with Taiwanese speaking . RTO today, 3/26/24- Has Slovak speaking .Giovana MALDONADO Complains of constipation, llq pain; Reviewed  colonoscopy form 2022 withoutdiverticulosis. Has llq pain which improves with BM. Baseline constipation. IMP: 1. no BE 2. Constipation 3. Type 2 DM 4. HLD 5. HTN  PLAN: 1.d/c ppin and h2 blocker 2.

## 2024-03-26 NOTE — REVIEW OF SYSTEMS
[As Noted in HPI] : as noted in HPI [Constipation] : constipation [Negative] : Heme/Lymph [Recent Weight Gain (___ Lbs)] : no recent weight gain [Recent Weight Loss (___ Lbs)] : no recent weight loss [Diarrhea] : no diarrhea [Abdominal Pain] : no abdominal pain

## 2024-03-26 NOTE — HISTORY OF PRESENT ILLNESS
[de-identified] : 04/2022 Small hh, Perea's esophagus (Augello) 02/2024 NO hh. NO hpylori, No IM [FreeTextEntry1] : 04/2022 dim polyp, grade 2 int. hem ( Petra)

## 2024-03-26 NOTE — PHYSICAL EXAM
[Normal Voice/Communication] : normal voice/communication [Alert] : alert [Healthy Appearing] : healthy appearing [No Acute Distress] : no acute distress [Sclera] : the sclera and conjunctiva were normal [Normal Lips/Gums] : the lips and gums were normal [Hearing Threshold Finger Rub Not [place]] : hearing was normal [Oropharynx] : the oropharynx was normal [Normal Appearance] : the appearance of the neck was normal [No Neck Mass] : no neck mass was observed [No Respiratory Distress] : no respiratory distress [Respiration, Rhythm And Depth] : normal respiratory rhythm and effort [No Acc Muscle Use] : no accessory muscle use [Auscultation Breath Sounds / Voice Sounds] : lungs were clear to auscultation bilaterally [Heart Rate And Rhythm] : heart rate was normal and rhythm regular [Normal S1, S2] : normal S1 and S2 [Murmurs] : no murmurs [Bowel Sounds] : normal bowel sounds [Abdomen Tenderness] : non-tender [Abdomen Soft] : soft [No Masses] : no abdominal mass palpated [Oriented To Time, Place, And Person] : oriented to person, place, and time [] : no hepatosplenomegaly

## 2024-09-10 ENCOUNTER — APPOINTMENT (OUTPATIENT)
Dept: GASTROENTEROLOGY | Facility: CLINIC | Age: 69
End: 2024-09-10
Payer: MEDICARE

## 2024-09-10 VITALS — DIASTOLIC BLOOD PRESSURE: 70 MMHG | SYSTOLIC BLOOD PRESSURE: 128 MMHG

## 2024-09-10 VITALS
HEART RATE: 75 BPM | OXYGEN SATURATION: 99 % | HEIGHT: 58.66 IN | WEIGHT: 138 LBS | BODY MASS INDEX: 28.2 KG/M2 | RESPIRATION RATE: 16 BRPM

## 2024-09-10 DIAGNOSIS — I10 ESSENTIAL (PRIMARY) HYPERTENSION: ICD-10-CM

## 2024-09-10 DIAGNOSIS — K21.9 GASTRO-ESOPHAGEAL REFLUX DISEASE W/OUT ESOPHAGITIS: ICD-10-CM

## 2024-09-10 DIAGNOSIS — R19.8 OTHER SPECIFIED SYMPTOMS AND SIGNS INVOLVING THE DIGESTIVE SYSTEM AND ABDOMEN: ICD-10-CM

## 2024-09-10 DIAGNOSIS — R10.13 EPIGASTRIC PAIN: ICD-10-CM

## 2024-09-10 DIAGNOSIS — K22.70 BARRETT'S ESOPHAGUS W/OUT DYSPLASIA: ICD-10-CM

## 2024-09-10 PROCEDURE — 99214 OFFICE O/P EST MOD 30 MIN: CPT

## 2024-09-10 NOTE — ASSESSMENT
[FreeTextEntry1] : 67 yo female, from [facility], referred for evaluation of bloating, loose bms, epigastric pain. Previous evaluation from ? Valentin Lambert MD records to be requested. Hx of DM, hld, HTN and DM. No weight loss or anorexia.complains of rectal discomfort/pain, not responded to bacitracin rx by GYN md.  RTO today 1/25/24- reveiwed egd and supplement from Dr. Lambert. Hx of BE for followup; has altered bms, sometimes constipated. Seen with Sammarinese speaking . RTO today, 3/26/24- Has Divehi speaking .Giovana MALDONADO Complains of constipation, llq pain; Reviewed  colonoscopy form 2022 withoutdiverticulosis. Has llq pain which improves with BM. Baseline constipation.  RTO 91024 seen with Giovana stilesator. No more constipatoin, GERD controlled completely with famotidine 40 mg daily. No dysphagia. Feels well. Reviewed last egd from 2/24 without BE. IMP: 1. no BE 2. Constipation improved 3. Type 2 DM 4. HLD 5. HTN  PLAN: 1. maintain h2 blocker 2. reviewed life style changes with pt to reduce gerd. Discussed need to take famotidine same time daily 3. RTO 6months or prn

## 2024-09-10 NOTE — PHYSICAL EXAM
[Alert] : alert [Normal Voice/Communication] : normal voice/communication [Healthy Appearing] : healthy appearing [No Acute Distress] : no acute distress [Sclera] : the sclera and conjunctiva were normal [Hearing Threshold Finger Rub Not [place]] : hearing was normal [Normal Lips/Gums] : the lips and gums were normal [Oropharynx] : the oropharynx was normal [Normal Appearance] : the appearance of the neck was normal [No Neck Mass] : no neck mass was observed [No Respiratory Distress] : no respiratory distress [No Acc Muscle Use] : no accessory muscle use [Respiration, Rhythm And Depth] : normal respiratory rhythm and effort [Auscultation Breath Sounds / Voice Sounds] : lungs were clear to auscultation bilaterally [Heart Rate And Rhythm] : heart rate was normal and rhythm regular [Normal S1, S2] : normal S1 and S2 [Murmurs] : no murmurs [Bowel Sounds] : normal bowel sounds [Abdomen Tenderness] : non-tender [No Masses] : no abdominal mass palpated [Abdomen Soft] : soft [] : no hepatosplenomegaly [Oriented To Time, Place, And Person] : oriented to person, place, and time

## 2024-09-10 NOTE — REVIEW OF SYSTEMS
[As Noted in HPI] : as noted in HPI [Negative] : Heme/Lymph [Heartburn] : heartburn [Recent Weight Gain (___ Lbs)] : no recent weight gain [Recent Weight Loss (___ Lbs)] : no recent weight loss [Abdominal Pain] : no abdominal pain [Constipation] : no constipation [Diarrhea] : no diarrhea

## 2024-09-10 NOTE — HISTORY OF PRESENT ILLNESS
[de-identified] : 04/2022 Small hh, Perea's esophagus (Augello) 02/2024 NO hh. NO hpylori, No IM [FreeTextEntry1] : 04/2022 dim polyp, grade 2 int. hem ( Petra)

## 2025-01-28 ENCOUNTER — APPOINTMENT (OUTPATIENT)
Dept: GASTROENTEROLOGY | Facility: CLINIC | Age: 70
End: 2025-01-28
Payer: MEDICARE

## 2025-01-28 ENCOUNTER — NON-APPOINTMENT (OUTPATIENT)
Age: 70
End: 2025-01-28

## 2025-01-28 VITALS
SYSTOLIC BLOOD PRESSURE: 108 MMHG | DIASTOLIC BLOOD PRESSURE: 60 MMHG | HEIGHT: 58 IN | HEART RATE: 80 BPM | BODY MASS INDEX: 28.55 KG/M2 | OXYGEN SATURATION: 97 % | WEIGHT: 136 LBS | RESPIRATION RATE: 16 BRPM

## 2025-01-28 DIAGNOSIS — Z12.11 ENCOUNTER FOR SCREENING FOR MALIGNANT NEOPLASM OF COLON: ICD-10-CM

## 2025-01-28 DIAGNOSIS — E78.2 MIXED HYPERLIPIDEMIA: ICD-10-CM

## 2025-01-28 DIAGNOSIS — I10 ESSENTIAL (PRIMARY) HYPERTENSION: ICD-10-CM

## 2025-01-28 DIAGNOSIS — Z86.0100 PERSONAL HISTORY OF COLON POLYPS, UNSPECIFIED: ICD-10-CM

## 2025-01-28 PROCEDURE — 99213 OFFICE O/P EST LOW 20 MIN: CPT

## 2025-01-28 RX ORDER — SODIUM SULFATE, POTASSIUM SULFATE AND MAGNESIUM SULFATE 1.6; 3.13; 17.5 G/177ML; G/177ML; G/177ML
17.5-3.13-1.6 SOLUTION ORAL
Qty: 354 | Refills: 0 | Status: ACTIVE | COMMUNITY
Start: 2025-01-28 | End: 1900-01-01

## 2025-03-26 ENCOUNTER — APPOINTMENT (OUTPATIENT)
Dept: GASTROENTEROLOGY | Facility: AMBULATORY SURGERY CENTER | Age: 70
End: 2025-03-26
Payer: MEDICARE

## 2025-03-26 PROCEDURE — 45378 DIAGNOSTIC COLONOSCOPY: CPT
